# Patient Record
Sex: MALE | Race: WHITE
[De-identification: names, ages, dates, MRNs, and addresses within clinical notes are randomized per-mention and may not be internally consistent; named-entity substitution may affect disease eponyms.]

---

## 2020-04-13 ENCOUNTER — HOSPITAL ENCOUNTER (INPATIENT)
Dept: HOSPITAL 46 - ED | Age: 65
LOS: 2 days | Discharge: HOME | DRG: 343 | End: 2020-04-15
Attending: SURGERY | Admitting: SURGERY
Payer: MEDICARE

## 2020-04-13 VITALS — BODY MASS INDEX: 25.31 KG/M2 | HEIGHT: 68 IN | WEIGHT: 167 LBS

## 2020-04-13 DIAGNOSIS — F02.80: ICD-10-CM

## 2020-04-13 DIAGNOSIS — S06.9X0S: ICD-10-CM

## 2020-04-13 DIAGNOSIS — Z79.899: ICD-10-CM

## 2020-04-13 DIAGNOSIS — Z79.84: ICD-10-CM

## 2020-04-13 DIAGNOSIS — R73.03: ICD-10-CM

## 2020-04-13 DIAGNOSIS — K40.30: Primary | ICD-10-CM

## 2020-04-13 DIAGNOSIS — E78.5: ICD-10-CM
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
## 2020-04-13 DIAGNOSIS — Z87.891: ICD-10-CM

## 2020-04-13 DIAGNOSIS — K37: ICD-10-CM

## 2020-04-13 DIAGNOSIS — I10: ICD-10-CM

## 2020-04-13 PROCEDURE — C1781 MESH (IMPLANTABLE): HCPCS

## 2020-04-13 PROCEDURE — A9270 NON-COVERED ITEM OR SERVICE: HCPCS

## 2020-04-13 NOTE — NUR
BED ALARM SOUNDING, SBA TO RESTROOM FOR UNMEASURED VOID, URINE HAT PLACED IN
TOILET. pt IMPULSIVE BACK TO BED AFTER VERBALIZING UNDERSTANDING TO USE
BATHROOM CALL LIGHT. BED ALARM ON, SCDS ON. CALL LIGHT IN REACH. IVF INFUSING
WNL AS ORDERED.

## 2020-04-13 NOTE — NUR
BEDSIDE REPORT RECEIVED FROM RNS VANESSA AND LAKISHA. pt RESTING IN BED. RATES
PAIN 1/10 RLQ. DENIES ANY NEEDS AT THIS TIME. BED ALARM ON. CALL LIGHT IN
REACH. pt DEMONSTRATES USE OF CALL LIGHT.

## 2020-04-13 NOTE — NUR
ASSESSMENT COMPLETE. PT RESTING IN BED. PAIN 1/10 AS LONG AS NOT MOVING. NEEDS
REMINDERS. CALL LIGHT WITHIN REACH.

## 2020-04-13 NOTE — NUR
pt ASSESSMENT COMPLETE. pt DENIES PAIN STATES "IT'S BETTER". DENIES NAUSEA.
HYPOACTIVE BOWEL TONES, ABDOMEN SOFT, TENDER RLQ. IV FLUSHED INFUSING WNL.
VSS. pt ORIENTED TO SELF, , REORIENTATION TO SPECIFIC LOCATION, DATE
PROVIDED. BED ALARM ON. SCDS IN PLACE. CALL LIGHT IN REACH. EDUCATED pt ON
NPO, DENIES NEED FOR ICE CHIPS.

## 2020-04-13 NOTE — EKG
[facility]
                                    [address]
                                  [place], [place]  [address]
_________________________________________________________________________________________
                                                                 Signed   
 
 
Normal sinus rhythm
Normal ECG
No previous ECGs available
Confirmed by KURT OLIVA MD ([id number]) on 4/13/2020 9:41:18 PM
 
 
 
 
 
 
 
 
 
 
 
 
 
 
 
 
 
 
 
 
 
 
 
 
 
 
 
 
 
 
 
 
 
 
 
 
 
 
 
 
 
    Electronically Signed By: KURT OLIVA MD  04/13/20 2141
_________________________________________________________________________________________
PATIENT NAME:     LUCINA PIZANO LEODAN GRIFFITH                        
MEDICAL RECORD #: [id number]                     Electrocardiogram             
          ACCT #: [id number]  
DATE OF BIRTH:   [birth year]                                       
PHYSICIAN:   KURT OLIVA MD           REPORT #: [id number]
REPORT IS CONFIDENTIAL AND NOT TO BE RELEASED WITHOUT AUTHORIZATION

## 2020-04-14 PROCEDURE — 0YU50JZ SUPPLEMENT RIGHT INGUINAL REGION WITH SYNTHETIC SUBSTITUTE, OPEN APPROACH: ICD-10-PCS | Performed by: SURGERY

## 2020-04-14 PROCEDURE — 0DTJ0ZZ RESECTION OF APPENDIX, OPEN APPROACH: ICD-10-PCS | Performed by: SURGERY

## 2020-04-14 NOTE — NUR
pt AWAKENS TO VOICE. VSS. RATES PAIN 1-2/10 IN LOWER ABDOMEN, "PRESSURE".
DENIES NEED FOR PRN MEDICATION. BOWEL TONES HYPOACTIVE LLQ, ACTIVE THROUGHOUT,
ABD SOFT. DENIES TOILETING NEEDS. CALL LIGHT IN REACH. BED ALARM ON. IVF
INFUSING WNL AS ORDERED. pt REORIENTED TO NPO ORDERS, VERBALIZES
UNDERSTANDING.

## 2020-04-14 NOTE — NUR
PT ASLEEP AT BEGINNING OF REPORT. WOKE UP AND GOT UP TO THE BATHROOM WITH
STAND BY ASSIST. NO NEEDS AT THIS TIME.

## 2020-04-14 NOTE — NUR
pt DENIES NAUSEA, NO EMESIS, DENIES PAIN, NO PRN MEDS ADMINISTERED. pt IS
IMPULSIVE, OCCASIONAL CALL LIGHT IN USE, BED ALARM IN PLACE. FREQUENT
REORIENTATION THROUGHOUT SHIFT. NPO. IV FLUID INFUSING AS ORDERED. SCD's ON.
SBA TO BATHROOM.

## 2020-04-14 NOTE — NUR
04/14/20 1249 Sheets,Milady
1241 PT ARRIVED TO PACU ON 6L VIA MASK, RESP EVEN AND UNLABORED. PT
DENIES PAIN AND NAUSEA. VSS.

## 2020-04-14 NOTE — NUR
PT AWAKE RESTING IN BED. NO NEED TO USE THE RESTROOM YET. IS READY TO TRY SOME
MORE FOOD. TOLERATED JELLO EARLIER. PT HAS REMOVED O2 AT SOME TIME AND HIS
SAO2 97% ON RA. NO PAIN OR NAUSEA AT THIS TIME.

## 2020-04-14 NOTE — NUR
BED ALARM SOUNDING, SBA TO RESTROOM FOR VOID IN URINAL. pt BACK IN BED, DENIES
PAIN. SANGUINOUS DRAINAGE NOTED IN ROSALIND DRAIN. BED ALARM ON. IVF INFUSING WNL AS
ORDERED. CALL LIGHT IN REACH. YOGURT AND FRUIT PROVIDED AT THIS TIME. NO
ADDITIONAL REQUESTS.

## 2020-04-14 NOTE — NUR
Manual BP of 90/54, HR in the 90's. Dr. Bonilla notified. Orders acknowledged to
bolus 1L of LR and then continue maintenance fluids at 85 mls/hr. Will
continue to monitor.

## 2020-04-14 NOTE — NUR
PATIENT JUST COME BACK FROM SURGERY. VITAL SIGNS DONE BY RN. CALL LIGHT WITHIN
REACH. NO OTHER NEEDS AT THIS TIME

## 2020-04-14 NOTE — NUR
PT ALERT, APPEARS RESTLESS, SPONTANEUS.
WAS TRYING TO GET UP ON HIS OWN. PT FORGETFUL. BED
ALARM PLACED FOR SAFETY. CARLYN CASTRO HELPED WITH USE OF URINAL. PT PLEASANTLY
CONFUSED,
NEEDS
CONSTANT REMINDER AND REDIRECTION.

## 2020-04-14 NOTE — NUR
BP 95/49 AT 1815 AFTER BOLUS. PT ASYMPTAMATIC. NOTIFIED DR. MCCALL AND TOOK
PARAMETERS TO PASS ON TO THE NEXT SHIFT.

## 2020-04-14 NOTE — NUR
CHECKED ON pt. RESTING IN BED WITH EYES CLOSED, BREATHING UNLABORED. LIGHTS
OFF IN ROOM. BED ALARM ON.

## 2020-04-14 NOTE — NUR
Received call from Precious, pt's daughter. Pt lives with Precious in [place] the
majority of the time, but comes and spends few days with her brother monthly
in [place].  Daughter states concern as she is unsure what he will need on
dc.  Updated Dr. Rodríguez will complete orders and they will give them to her in
writting. She states she is unsure if she will pick him up or if her brother
will.  Let her know I will give her number to Dr. Rodríguez so he can call after
surgery.  Daughter states pt has had a TBI and only has about 10 sec memory.
He was living with his parents and not doing well.  She moved him in with her
and she has put him on a diet and has him exercise daily.  Denies use of DME.

## 2020-04-14 NOTE — NUR
pt's BED ALARM ALARMING, pt SITTING UP ON SIDE OF BED. RATES PAIN 3/10, DENIES
NEED FOR PRN MEDICATION. VS STABLE, NEW BAG OF IV FLUIDS HANGING AT ORDERED
RATE.  IN ROOM DRAWING AM LABS. pt REMAINS SITTING ON SIDE OF BED CALL
LIGHT IN REACH AND BED ALARM ON, DENIES ANY OTHER NEEDS AT THIS TIME.

## 2020-04-14 NOTE — NUR
PATIENT RESTING IN BED. PATIENT GOES TO USE THE BATHROOM. ONE PERSON
ASSISTING. PATIENT BACKS TO BED. BED ALARM ON. CALL LIGHT WITHIN REACH. NO
OTHER NEEDS AT THIS TIME

## 2020-04-14 NOTE — NUR
PT ALERT, ORIENTED MYRA HIMSELF, PLACE AND SITUATION. SPONTANEUSE BU
TCOOPERATIVE AND EASY TO REDIRECT. DONE WITH ASSESSMENT. EMPTIED ROSALIND DRAIN AND
REINFORCED DRESSING ON ROSALIND DRAIN WITH SOME GAUSES. PT USED I.S, TEMP WENT DOWN
TO 99.5 F. REPORTED SOME DISCOMFORT. SEE MAR FOR MEDICATION ADMINISTRATION .
FLUSHED BOTH IV WITH 10 ML OF NS WITHOUT ANY DIFFICULTIES.

## 2020-04-14 NOTE — NUR
pt's DAUGHTER CALLED WANTING AN UPDATE, UPDATE GIVEN AND THEN DAUGHTER
TRANSFERED INTO ROOM TO TALK TO pt. pt ASSISTED WITH ANSWERING PHONE

## 2020-04-14 NOTE — NUR
pt BED ALARM ALARMING, pt WAS GETTING UP TO WALK TO THE BATHROOM.
pt ASSISTED TO THE BATHROOM AND THEN
BACK TO BED. pt RESTING SAFELY IN BED WITH CALL LIGHT IN REACH AND BED ALARM
ON DENIES ANY OTHER NEEDS AT THIS TIME.

## 2020-04-14 NOTE — NUR
PATIENT CALLED TO USE THE BATHROOM. PATIENT IS BACK IN BED. PATIENT STATED "
NO GO". BED ALARM ON FOR SAFETY. NO OTHER NEEDS AT THIS TIME.

## 2020-04-14 NOTE — NUR
pt ATTEMPTING TO GET OUT OF BED INDEPENDENTLY. SBA TO RESTROOM  ML VOID
IN URINAL AND BACK TO BED. pt ASSISTED WITH REMOTE. RESTING IN BED WITH CALL
LIGHT IN REACH. IVF INFUSING WNL AS ORDERED. CPOX IN PLACE SPO2 WNL. SCDS ON.
BED ALARM ON.

## 2020-04-14 NOTE — NUR
pt USED CALL LIGHT TO ASK FOR ASSISTANCE TO THE BATHROOM. pt SBA TO BATHROOM
AND BACK TO BED. RESTING SAFELY WITH CALL LIGHT IN REACH AND BED ALARM ON,
DENIES ANY OTHER NEEDS AT THIS TIME

## 2020-04-15 NOTE — NUR
PT ALERT, IMPULSIVE, FORGETFUL. BED ALARM IS ON FOR SAFETY. PT AMBULATED THIS
EVENING AROUND THE UNIT. SIS NOT SLEEP MUCH DURING THIS SHIFT. ADEQUATE
OUTPUT. PT NEEDS CONSTANT REMINDER TO USE THE URINAL. STATED HIS PAIN IS
TOLERABLE, DENIED PRN PAIN MEDICATION. DRESSING INTACT. THERE IS A
SCANT AMOUNT OF RED DISCHARGE ON THE DRESSING BUT NO CHANGES SINCE THE
BEGINNING OF THE SHIFT. ROSALIND DRESSING WAS REINFORCED WITH GAUZES, DRAINED 25 ML.
tHE BLOOD CLOT WAS NOTED IN ROSALIND DRAIN, STRIPPED THE TUBING.
SCD PLACED.
VSS.
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
## 2020-04-15 NOTE — NUR
REPORT RECIEVED FROM CHARGE NURSE, UPDATES FROM PRIMARY CARE NIGHT SHIFT
NURSE.  PATIENT IS UP TO CHAIR.

## 2020-04-15 NOTE — NUR
pt ambulated with tiffanie Roth around the unit. Pt stated he is not bale to
sleep. VSS, dressing dry, intact. ROSALIND drained 5 ml, tubing stripped. Pt
reported mild discomfort. Refused prn pain medication. Stated "I can wait
until morning". Pt in bed, dim light in the room. bed alarm is on.

## 2020-04-15 NOTE — NUR
PATIENT RESTING IN BED. RN IN ROOM. SETS UP TABLE FOR BREAKFAST. CALL LIGHT
WITHIN REACH. NO OTHER NEEDS AT THIS TIME

## 2020-04-15 NOTE — PATH
[facility]
                                    [address] [place], [place]  [address]
_________________________________________________________________________________________
                                                                 Signed   
 
 
 
SPECIMEN(S): A APPENDIX
SPECIMEN(S): B RIGHT INGUINAL HERNIA SAC
 
SPECIMEN SOURCE:
A. APPENDIX
B. RIGHT INGUINAL HERNIA SAC
 
CLINICAL HISTORY:
A) Ischemic inflammation of appendix.  B) Incarcerated right inguinal hernia 
with obstruction. 
 
FINAL PATHOLOGIC DIAGNOSIS:
A.  Appendix, appendectomy:
-  Acute appendicitis.
B.  Right inguinal hernia sac, herniorrhaphy:
-  Hernia sac.
DDF:emb:C2NR
 
MICROSCOPIC EXAMINATION:
Histologic sections of all submitted blocks are examined by light microscopy.  
These findings, together with the gross examination, support the pathologic 
diagnosis. 
 
GROSS DESCRIPTION:
Two specimens are received in two containers, labeled "LP."
A.  The specimen, labeled "LP," and designated on the requisition "appendix," 
is received in formalin and consists of 
Specimen: Appendix with mesoappendix.
Dimensions: 7.5 x 0.6 cm.
Serosa: Pink-red, focally congested with enlarged and congested subserosal 
vessels. 
Perforation: Not grossly identified.
Inking: Staple line is inked black.
Mucosa: Pink-tan.
Fecalith: Not grossly identified.
Additional: None.
Representative sections are submitted in cassette (A1).
B.  The specimen, labeled "LP," and designated on the requisition "right 
inguinal hernia sac," is received in formalin and consists of one piece of 
pink-tan, fibromembranous tissue that measures 16.0 
x 4.2 x 0.7 cm.  Sectioning through the specimen is unremarkable.  No masses or 
 
                                                                                    
_________________________________________________________________________________________
PATIENT NAME:     LUCINA PIZANO JR                        
MEDICAL RECORD #: [id number]            PATHOLOGY                     
          ACCT #: [id number]       ACCESSION #: [id number]     
DATE OF BIRTH:   [birth year]            REPORT #: [id number]       
PHYSICIAN:        SAGAR JEAN BAPTISTE              
PCP:              NO PRIMARY CARE PHYSICIAN     
REPORT IS CONFIDENTIAL AND NOT TO BE RELEASED WITHOUT AUTHORIZATION
 
 
                                  [facility]
                                    [address] [place] [address]  [address]
_________________________________________________________________________________________
                                                                 Signed   
 
 
abnormalities are identified.  Representative sections are submitted in 
cassette (B1). 
JS (under the direct supervision of a pathologist)
 
The Gross Description was prepared using a voice recognition system.  The 
report was reviewed for accuracy; however, sound-alike word errors, addition 
and/or deletions may occur.  If there is any 
question about this report, please contact Client Services.
 
PERFORMING LABORATORY:
The technical component was performed by [facility], [address] [place] [address] (Medical Director: Nikky Angeles MD; CLIA# [id number]). 
Professional interpretation was performed by 
[facility], [address] [place] [address] (CLIA# [id number]). 
 
Diagnostician:  Parmjit Owen DO
Pathologist
Electronically Signed 04/15/2020
 
 
Copies:                                
~
 
 
 
 
 
 
 
 
 
 
 
 
 
 
 
 
 
 
 
 
                                                                                    
_________________________________________________________________________________________
PATIENT NAME:     LUCINA PIZANO JR                        
MEDICAL RECORD #: [id number]            PATHOLOGY                     
          ACCT #: [id number]       ACCESSION #: [id number]     
DATE OF BIRTH:   [birth year]            REPORT #: [id number]       
PHYSICIAN:        SAGAR PATHOLOGY              
PCP:              NO PRIMARY CARE PHYSICIAN     
REPORT IS CONFIDENTIAL AND NOT TO BE RELEASED WITHOUT AUTHORIZATION

## 2020-04-15 NOTE — NUR
Spoke with Jesse for CM assessment.  He states he has dementia and can't
remember most things.  Discussed he had a TBI, he is unable to remember how
this happened.  He struggles to remember what town he lives in, PCP, or
pharmacy.  He finally states I should speak with his daughter.  States he
lives in a 1 story home without steps.  He moved in with his daughter after
living with his parents.  States he lost close to 100# as she put him on a
healthy diet and had him start exercising.  States he feels good and was able
to get rid of his CPAP.  Plans on dc to home with daughter when he is released
today.  Denies needs at home.
 
Spoke with daughter and she is driving from [place] to pick him up.  She also
denies any needs for a safe discharge.  She will stop by her brother's house
and  sweat pants for him to wear home.  [facility]"" pharmacy called and his
meds are ready in [place].

## 2020-04-15 NOTE — NUR
PT ALERT, ORIENTED AND SITTING UP IN BED. HAD GOOD VISIT WITH PT, SEEMED TO
ENJOY COMPANY. PT LIVES WITH HIS DAUGHTER AND SON ALTERNATELY. PT TALKED ABOUT
HUNTING AND FISHING, PLANS ON DC TODAY. GAVE BLESSING AS STAFF CAME IN TO GET
PT READY FOR DC.

## 2020-04-15 NOTE — NUR
MORNING ASSESSMENT DONE.  PATIENT UP TO CHAIR FOR BREAKFAST, CHAIR ALARM IS
ON.  PATIENT DENIES ANY PAIN OR NAUSEA THIS MORNING.  RIGHT LOWER QUADRANT
DRESSING IN INTACT WITH OLD SANGUINOUS DRAINAGE.  ROSALIND DRAIN TUBE STRIPPED,
SCANT AMOUNT OF SANGUINOUS DRAINAGE IN BULB.  LUNGS ARE CLEAR, BOWELS ARE
ACTIVE.  IVF INFUSING TO LEFT ARM.

## 2020-04-15 NOTE — NUR
SBA TO RESTROOM FOR VOID. AMBULATED IN HALLWAY SBA WITH THIS RN. pt TOLERATED
WELL, DENIES PAIN, STATES "JUST A LITTLE SORE". DENIES NEED FOR PRN
MEDICATION. BACK IN BED. VSS. BED ALARM ON. CARLYN PAIZ IN ROOM ASSESSING pt.

## 2020-04-15 NOTE — NUR
PATIENT SITTING UP IN CHAIR. PATIENT ASSISTED FOR STUDENT RN WITH SHAVING AND
ORAL CARE. LINENS CHANGED. CALL LIGHT WITHIN REACH. NO OTHER NEEDS AT THIS
TIME

## 2020-04-15 NOTE — NUR
DR. MCCALL IN TO SEE PATIENT.  RIGHT LOWER QUADRANT ROSALIND D/C'D AND COVERED WITH
BANDAID.  RLQ INCISION DRESSING IS INTACT.  RIGHT AND LEFT IV REMOVED WITH
CATHETER INTACT. PATIENT IS RESTING IN BED.

## 2020-04-15 NOTE — OR
[facility]
                                    [address], [place]  [address]
_________________________________________________________________________________________
                                                                 Signed   
 
 
DATE OF OPERATION:
04/14/2020
 
SURGEON:
Caleb Mccall MD
 
PREOPERATIVE DIAGNOSIS:
Giant incarcerated right inguinal hernia (cecum, appendix, and ileum).
 
POSTOPERATIVE DIAGNOSES:
1. Giant incarcerated right inguinal hernia, sliding type with incarcerated cecum,
appendix and ileum. 
2. Ischemic subacute inflammation of the appendix and cecum.
 
PROCEDURES PERFORMED:
1. Reduction of giant right inguinal hernia.
2. Repair of an incarcerated sliding right inguinal hernia with implantation of prolene
mesh, underlay technique. 
3. Appendectomy.
 
ANESTHESIA:
General endotracheal; Caleb Branham CRNA, and local 10 mL of 0.25% Marcaine with
epinephrine. 
 
INDICATION:
This 64-year-old white man has dementia related to traumatic brain injury and other
factors.  He usually lives in [place] with his daughter.  He is staying currently with his
son as a respite for his sister, who cares for him most of the time.  The patient has
had a very large bulky incarcerated right inguinal hernia for over a year.  He presented
to the emergency room late yesterday with a tense, nonreducible giant right inguinal
hernia extending into the scrotum.  He was evaluated by Dr. Han.  A CT scan of the
abdomen was undertaken, which showed incarceration of generous portion of the cecum and
small bowel and appendix was present as well.  His white count was elevated.  He did not
have overt small-bowel obstruction, though there were proximal bowel loops that were
somewhat dilated.  My attempts at reduction were unsuccessful.  He does not have severe
tenderness at the site of the obstruction or in the scrotum and does not have
peritonitis.  He was admitted given fluid resuscitation, IV antibiotics and now is to
undergo right inguinal hernia repair with reduction of the hernia.  The risks of
bleeding, infection, need for midline incision to allow for reduction of the hernia, and
other unforeseen complications were reviewed in detail.  The patient understands to the
degree he can as does his son who attends to him. 
 
 
    Electronically Signed By: CALEB MCCALL MD  04/15/20 [facility]
_________________________________________________________________________________________
PATIENT NAME:     LUCINA PIZANO JR                        
MEDICAL RECORD #: [id number]            OPERATIVE REPORT              
          ACCT #: [id number]  
DATE OF BIRTH:   [birth year]            REPORT #: [id number]      
PHYSICIAN:        CALEB MCCALL MD                 
PCP:              NO PRIMARY CARE PHYSICIAN     
REPORT IS CONFIDENTIAL AND NOT TO BE RELEASED WITHOUT AUTHORIZATION
 
 
                                  [facility]
                                    [address], [place]  [address]
_________________________________________________________________________________________
                                                                 Signed   
 
 
FINDINGS:
Even after general and endotracheal anesthesia hernia could not be reduced.  Operative
reduction was undertaken after a fair amount of dissection.  A large indirect sac was
noted.  A sliding component of right colon was noted to the posterior wall.  The cecum
itself was edematous and initially ischemic, but not reversibly so and once the
reduction was undertaken, prompt resumption of good flow to the cecum was noted.  The
appendix was similarly ischemic and although not necrotic by any means, did not "pink
up" in the way the cecum did and on that basis appendectomy was performed. 
 
Reduction of the hernia and excision of the large hernia sac was undertaken.  Prolene
mesh was implanted in the properitoneal space in an underlay technique.  Good repair of
the hernia was noted.  Cord structures were preserved.  A drain was placed given the
extent of dissection. 
 
DESCRIPTION OF PROCEDURE:
The patient was brought to the operating room and given a general endotracheal
anesthesia per COVID-19 protocol.  After 28 minutes of air exchange time, the remaining
team presented and the right groin and genitalia were clipped and prepared with a
chlorhexidine solution and draped sterilely.  Photographs were taken.  Attempts at
reducing the hernia now that he was completely relaxed were completely unsuccessful.  An
incision was made in the lower abdominal wall crease on the right side.  Dissection
carried through the skin sharply and with electrocautery through the subcutaneous
tissue.  Edema of the subcutaneous spaces was noted.  External oblique was defined well
from the external ring.  Still no reduction of the hernia was possible.  External
oblique was incised along its fibers had further dissection undertaken identifying well
the neck of the hernia extending into the scrotum.  The cremasteric muscle layer was
rather thickened and hypertrophied as a testimony to long-standing nature of this
hernia.  His fibers were incised as was the internal spermatic fascia and ultimately
reduction of the hernia could be undertaken. 
 
The hernia sac was then dissected free from the cord structures using blunt
electrocautery dissection with meticulous attention to hemostasis.  Hernia sac was quite
large indeed and was fully dissected free.  The hernia sac was opened and the neck of
the hernia sac was approximately 4 to 6 cm in diameter at minimum.  Internally some
inflammatory ascites fluid was noted.  The cecum was then delivered out of the
peritoneal cavity and showed ischemic changes.  No sign of necrosis by any means.  The
appendix similarly showed such findings.  With a brief amount of time, the cecum
returned to a normal color with good flow.  The appendix on the other hand stayed rather
ischemic.  Rather than hazard a delayed appendicitis or other problem.  Appendectomy was
deemed appropriate. 
 
[place] clamp used to elevate the appendix and the mesoappendix secured with hemostats
 
    Electronically Signed By: CALEB MCCALL MD  04/15/20 1037
_________________________________________________________________________________________
PATIENT NAME:     LUCINA PIZANO JR                        
MEDICAL RECORD #: [id number]            OPERATIVE REPORT              
          ACCT #: [id number]  
DATE OF BIRTH:   [birth year]            REPORT #: [id number]      
PHYSICIAN:        CALEB MCCALL MD                 
PCP:              NO PRIMARY CARE PHYSICIAN     
REPORT IS CONFIDENTIAL AND NOT TO BE RELEASED WITHOUT AUTHORIZATION
 
 
                                  [facility]
                                    [address], [place]  [address]
_________________________________________________________________________________________
                                                                 Signed   
 
 
and tied with 2-0 silk ties.  The base of the appendix was crushed and milked distally
in the crush barbie ligated with a 2-0 Vicryl.  The appendix was amputated.  The stump
cauterized and the stump inverted with two separate interrupted 2-0 silk sutures
inverting the stump completely.  The cecum and ileum were returned to the peritoneal
cavity.  Further dissection of the hernia sac internally showed attachments of the cecum
to the hernia sac, making this a sliding-type hernia.  These adhesions were freed more
fully allowing for complete dissection of the hernia sac itself.  The hernia sac was
closed with a running 2-0 silk suture in a bidirectional configuration as it was broad
and wide.  The redundant hernia sac amputated and passed for pathology.  An Allis clamp
was applied to the tendon of the transversus abdominis.  The properitoneal space was
incised with electrocautery.  The properitoneal fat bluntly  free.  A segment
of Prolene mesh was cut to an elliptical configuration and secured in an underlay
technique with interrupted 2-0 Prolene suture.  Photographs were taken throughout the
repair.  The cord structures were found to be well preserved.  No sign of injury
occurred to it during the course of dissection.  10 mL of 0.25% Marcaine with
epinephrine was injected locally.  The cord was placed into the canal and through a
separate stab incision laterally, a 7 mm flat Oswald drain was placed extending from the
floor of the canal down into the scrotum.  Given the large empty space and soft tissue,
which was edematous.  External oblique was reapproximated with running 2-0 Vicryl
suture.  Nohemi's layers were approximated with interrupted 3-0 Vicryl and skin closed
with running subcuticular 3-0 Vicryl.  Steri-Strips were applied.  Drains attached to
bulb suction after being secured with a nylon suture.  The patient tolerated the
procedure well.  A prolonged extubation is anticipated based on the COVID-19 extubation
protocol. 
 
 
 
            ________________________________________
            Caleb Mccall MD 
 
 
JM/MODL
Job #:  [id number]
DD:  04/14/2020 12:37:32
DT:  04/14/2020 13:22:25
 
cc:            Dr. Han
 
 
Copies:                                
 
 
 
    Electronically Signed By: CALEB MCCALL MD  04/15/20 1037
_________________________________________________________________________________________
PATIENT NAME:     LUCINA PIZANO JR                        
MEDICAL RECORD #: [id number]            OPERATIVE REPORT              
          ACCT #: [id number]  
DATE OF BIRTH:   [birth year]            REPORT #: [id number]      
PHYSICIAN:        CALEB MCCALL MD                 
PCP:              NO PRIMARY CARE PHYSICIAN     
REPORT IS CONFIDENTIAL AND NOT TO BE RELEASED WITHOUT AUTHORIZATION
 
 
                                  [facility]
                                    [address] [place] [address]  [address]
_________________________________________________________________________________________
                                                                 Signed   
 
 
~
 
 
 
 
 
 
 
 
 
 
 
 
 
 
 
 
 
 
 
 
 
 
 
 
 
 
 
 
 
 
 
 
 
 
 
 
 
 
 
 
 
 
    Electronically Signed By: CALEB MCCALL MD  04/15/20 1037
_________________________________________________________________________________________
PATIENT NAME:     LUCINA PIZANO JR                        
MEDICAL RECORD #: [id number]            OPERATIVE REPORT              
          ACCT #: [id number]  
DATE OF BIRTH:   [birth year]            REPORT #: [id number]      
PHYSICIAN:        CALEB MCCALL MD                 
PCP:              NO PRIMARY CARE PHYSICIAN     
REPORT IS CONFIDENTIAL AND NOT TO BE RELEASED WITHOUT AUTHORIZATION
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
